# Patient Record
Sex: FEMALE | URBAN - METROPOLITAN AREA
[De-identification: names, ages, dates, MRNs, and addresses within clinical notes are randomized per-mention and may not be internally consistent; named-entity substitution may affect disease eponyms.]

---

## 2018-08-08 ENCOUNTER — APPOINTMENT (RX ONLY)
Dept: URBAN - METROPOLITAN AREA CLINIC 375 | Facility: CLINIC | Age: 53
Setting detail: DERMATOLOGY
End: 2018-08-08

## 2018-08-08 DIAGNOSIS — L82.1 OTHER SEBORRHEIC KERATOSIS: ICD-10-CM

## 2018-08-08 DIAGNOSIS — L57.8 OTHER SKIN CHANGES DUE TO CHRONIC EXPOSURE TO NONIONIZING RADIATION: ICD-10-CM

## 2018-08-08 DIAGNOSIS — D18.0 HEMANGIOMA: ICD-10-CM

## 2018-08-08 DIAGNOSIS — D22 MELANOCYTIC NEVI: ICD-10-CM

## 2018-08-08 DIAGNOSIS — L81.4 OTHER MELANIN HYPERPIGMENTATION: ICD-10-CM

## 2018-08-08 PROBLEM — I63.50 CEREBRAL INFARCTION DUE TO UNSPECIFIED OCCLUSION OR STENOSIS OF UNSPECIFIED CEREBRAL ARTERY: Status: ACTIVE | Noted: 2018-08-08

## 2018-08-08 PROBLEM — L20.84 INTRINSIC (ALLERGIC) ECZEMA: Status: ACTIVE | Noted: 2018-08-08

## 2018-08-08 PROBLEM — D22.5 MELANOCYTIC NEVI OF TRUNK: Status: ACTIVE | Noted: 2018-08-08

## 2018-08-08 PROBLEM — F41.9 ANXIETY DISORDER, UNSPECIFIED: Status: ACTIVE | Noted: 2018-08-08

## 2018-08-08 PROBLEM — J44.9 CHRONIC OBSTRUCTIVE PULMONARY DISEASE, UNSPECIFIED: Status: ACTIVE | Noted: 2018-08-08

## 2018-08-08 PROBLEM — D18.01 HEMANGIOMA OF SKIN AND SUBCUTANEOUS TISSUE: Status: ACTIVE | Noted: 2018-08-08

## 2018-08-08 PROBLEM — M12.9 ARTHROPATHY, UNSPECIFIED: Status: ACTIVE | Noted: 2018-08-08

## 2018-08-08 PROCEDURE — ? COUNSELING

## 2018-08-08 PROCEDURE — 99203 OFFICE O/P NEW LOW 30 MIN: CPT

## 2018-08-08 ASSESSMENT — LOCATION ZONE DERM
LOCATION ZONE: LEG
LOCATION ZONE: ARM
LOCATION ZONE: TRUNK

## 2018-08-08 ASSESSMENT — LOCATION DETAILED DESCRIPTION DERM
LOCATION DETAILED: RIGHT MEDIAL SUPERIOR CHEST
LOCATION DETAILED: RIGHT KNEE
LOCATION DETAILED: LEFT SUPERIOR UPPER BACK
LOCATION DETAILED: LEFT MID-UPPER BACK
LOCATION DETAILED: RIGHT LATERAL ELBOW
LOCATION DETAILED: LEFT DISTAL POSTERIOR UPPER ARM
LOCATION DETAILED: RIGHT MEDIAL UPPER BACK

## 2018-08-08 ASSESSMENT — LOCATION SIMPLE DESCRIPTION DERM
LOCATION SIMPLE: RIGHT KNEE
LOCATION SIMPLE: LEFT UPPER BACK
LOCATION SIMPLE: LEFT POSTERIOR UPPER ARM
LOCATION SIMPLE: RIGHT ELBOW
LOCATION SIMPLE: CHEST
LOCATION SIMPLE: RIGHT UPPER BACK

## 2024-02-13 ENCOUNTER — HOSPITAL ENCOUNTER (EMERGENCY)
Facility: HOSPITAL | Age: 59
Discharge: STILL ADMITTED OR ADMINISTRATIVE | End: 2024-02-13
Payer: COMMERCIAL

## 2024-02-13 ENCOUNTER — APPOINTMENT (OUTPATIENT)
Dept: RADIOLOGY | Facility: HOSPITAL | Age: 59
End: 2024-02-13
Payer: COMMERCIAL

## 2024-02-13 ENCOUNTER — HOSPITAL ENCOUNTER (EMERGENCY)
Facility: HOSPITAL | Age: 59
Discharge: LEFT BEFORE DISCHARGE | End: 2024-02-13
Attending: STUDENT IN AN ORGANIZED HEALTH CARE EDUCATION/TRAINING PROGRAM
Payer: COMMERCIAL

## 2024-02-13 VITALS
BODY MASS INDEX: 31.53 KG/M2 | HEIGHT: 61 IN | OXYGEN SATURATION: 97 % | RESPIRATION RATE: 16 BRPM | DIASTOLIC BLOOD PRESSURE: 90 MMHG | SYSTOLIC BLOOD PRESSURE: 145 MMHG | WEIGHT: 167 LBS | HEART RATE: 93 BPM | TEMPERATURE: 97.2 F

## 2024-02-13 PROCEDURE — 74022 RADEX COMPL AQT ABD SERIES: CPT | Performed by: RADIOLOGY

## 2024-02-13 PROCEDURE — 4500999001 HC ED NO CHARGE

## 2024-02-13 PROCEDURE — 74022 RADEX COMPL AQT ABD SERIES: CPT

## 2024-02-13 PROCEDURE — 99285 EMERGENCY DEPT VISIT HI MDM: CPT | Performed by: STUDENT IN AN ORGANIZED HEALTH CARE EDUCATION/TRAINING PROGRAM

## 2024-02-13 ASSESSMENT — PAIN - FUNCTIONAL ASSESSMENT: PAIN_FUNCTIONAL_ASSESSMENT: 0-10

## 2024-02-13 ASSESSMENT — COLUMBIA-SUICIDE SEVERITY RATING SCALE - C-SSRS
1. IN THE PAST MONTH, HAVE YOU WISHED YOU WERE DEAD OR WISHED YOU COULD GO TO SLEEP AND NOT WAKE UP?: NO
2. HAVE YOU ACTUALLY HAD ANY THOUGHTS OF KILLING YOURSELF?: NO
6. HAVE YOU EVER DONE ANYTHING, STARTED TO DO ANYTHING, OR PREPARED TO DO ANYTHING TO END YOUR LIFE?: NO

## 2024-02-13 ASSESSMENT — PAIN DESCRIPTION - ORIENTATION: ORIENTATION: RIGHT;LOWER

## 2024-02-13 ASSESSMENT — PAIN DESCRIPTION - LOCATION: LOCATION: ABDOMEN

## 2024-02-13 ASSESSMENT — PAIN DESCRIPTION - DESCRIPTORS: DESCRIPTORS: CRAMPING

## 2024-02-13 ASSESSMENT — PAIN SCALES - GENERAL: PAINLEVEL_OUTOF10: 8

## 2024-02-24 NOTE — ED PROVIDER NOTES
Chief Complaint: Abdominal pain  HPI: This is a 58-year-old female, presenting to the emergency department for evaluation of abdominal pain which began today after her colonoscopy.  Patient states that she had a colonoscopy today, and was discharged home.  She states that since that time she has had severe abdominal pain.  She also complains of some nausea.    History reviewed. No pertinent past medical history.   History reviewed. No pertinent surgical history.    Physical Exam  Constitutional:       Appearance: Normal appearance.   HENT:      Head: Normocephalic and atraumatic.      Mouth/Throat:      Mouth: Mucous membranes are moist.   Eyes:      Conjunctiva/sclera: Conjunctivae normal.   Cardiovascular:      Rate and Rhythm: Normal rate.   Pulmonary:      Effort: Pulmonary effort is normal.   Abdominal:      General: Abdomen is flat.      Palpations: Abdomen is soft.      Tenderness: There is abdominal tenderness (Diffuse).   Musculoskeletal:         General: Normal range of motion.      Cervical back: Normal range of motion.   Skin:     General: Skin is warm and dry.   Neurological:      General: No focal deficit present.      Mental Status: She is alert.   Psychiatric:         Mood and Affect: Mood normal.            ED Course/[facility]  ED Course as of 02/24/24 1623   Tue Feb 13, 2024   1258 XR abdomen 2 views w chest 1 view [JH]      ED Course User Index  [JH] Nicky Puente DO       This is a 59 y.o. female presenting to the ED for evaluation of abdominal pain which began today.  Patient states that she had a colonoscopy today, and since that time has had severe abdominal pain.  She also complains of nausea however denies any vomiting.  On physical exam, the patient does appear anxious, however is in no acute distress.  Abdomen does have diffuse tenderness however no rebound, guarding, rigidity.  X-ray of the abdomen was obtained for concern of possible free air given her recent procedure, however was  grossly unremarkable.  At this facility, the CT scanner is currently down, however I do feel that she needs a CT scan.  I did speak with the ED provider at [facility], as the patient may need a surgeon if there is abnormalities found on the CT scan, and he accepted as an ED to ED transfer.  I did discuss this with the patient as well as other options for her to get a CT scan today including being transported by ambulance to [place] or CT scan and then transport back to this facility, transfer to [facility] by ambulance or transfer to [facility] by private vehicle.  She was notified that if she does travel by private vehicle, she may have to wait in the waiting room as they do have several patients in the waiting room at this time.  Patient was adamant about not being transferred by ambulance, and I feel that she is too unstable for transfer by private vehicle.  She is also not wanting to wait in the waiting room at [facility].  I discussed her options again for further evaluation of the abdominal pain, however the patient became overwhelmed and left the department without treatment complete    Final Impression  1.  Abdominal pain  Disposition/Plan: Left the department without treatment complete  Condition at disposition: Stable.     Nicky Puente DO  Emergency Medicine Physician     Nicky Puente DO  02/24/24 0983

## 2024-05-02 ENCOUNTER — APPOINTMENT (OUTPATIENT)
Dept: CARDIOLOGY | Facility: CLINIC | Age: 59
End: 2024-05-02
Payer: COMMERCIAL

## 2024-05-23 ENCOUNTER — APPOINTMENT (OUTPATIENT)
Dept: CARDIOLOGY | Facility: CLINIC | Age: 59
End: 2024-05-23
Payer: COMMERCIAL

## 2024-08-09 ENCOUNTER — APPOINTMENT (OUTPATIENT)
Dept: PRIMARY CARE | Facility: CLINIC | Age: 59
End: 2024-08-09
Payer: COMMERCIAL

## 2024-08-09 VITALS
TEMPERATURE: 96.8 F | HEART RATE: 89 BPM | BODY MASS INDEX: 31.61 KG/M2 | DIASTOLIC BLOOD PRESSURE: 80 MMHG | OXYGEN SATURATION: 98 % | SYSTOLIC BLOOD PRESSURE: 120 MMHG | HEIGHT: 60 IN | WEIGHT: 161 LBS

## 2024-08-09 DIAGNOSIS — K57.33 DIVERTICULITIS OF COLON WITH HEMORRHAGE: Primary | ICD-10-CM

## 2024-08-09 DIAGNOSIS — I25.3 ATRIAL SEPTAL ANEURYSM: ICD-10-CM

## 2024-08-09 DIAGNOSIS — N61.0 ACUTE MASTITIS OF RIGHT BREAST: ICD-10-CM

## 2024-08-09 DIAGNOSIS — G95.9 MYELOPATHY (MULTI): ICD-10-CM

## 2024-08-09 DIAGNOSIS — Q14.0 VITREOUS ANOMALIES: ICD-10-CM

## 2024-08-09 DIAGNOSIS — Z12.31 SCREENING MAMMOGRAM, ENCOUNTER FOR: ICD-10-CM

## 2024-08-09 DIAGNOSIS — K29.50 OTHER CHRONIC GASTRITIS WITHOUT HEMORRHAGE: ICD-10-CM

## 2024-08-09 PROBLEM — D62 ACUTE BLOOD LOSS ANEMIA: Status: ACTIVE | Noted: 2024-01-19

## 2024-08-09 PROBLEM — E66.811 OBESITY, CLASS I, BMI 30-34.9: Status: ACTIVE | Noted: 2024-01-17

## 2024-08-09 PROBLEM — E66.9 OBESITY, CLASS I, BMI 30-34.9: Status: ACTIVE | Noted: 2024-01-17

## 2024-08-09 PROBLEM — F17.200 NICOTINE USE DISORDER: Status: ACTIVE | Noted: 2024-01-17

## 2024-08-09 PROBLEM — K63.5 POLYP OF ASCENDING COLON: Chronic | Status: ACTIVE | Noted: 2024-01-19

## 2024-08-09 PROBLEM — K62.5 RECTAL BLEEDING: Status: ACTIVE | Noted: 2024-01-17

## 2024-08-09 PROBLEM — K52.9 COLITIS: Status: ACTIVE | Noted: 2024-01-17

## 2024-08-09 PROCEDURE — 99204 OFFICE O/P NEW MOD 45 MIN: CPT

## 2024-08-09 PROCEDURE — 3008F BODY MASS INDEX DOCD: CPT

## 2024-08-09 RX ORDER — CEPHALEXIN 500 MG/1
500 CAPSULE ORAL 2 TIMES DAILY
Qty: 14 CAPSULE | Refills: 0 | Status: SHIPPED | OUTPATIENT
Start: 2024-08-09 | End: 2024-08-16

## 2024-08-09 NOTE — PATIENT INSTRUCTIONS
Referrals made for Neuro, Neuro ophthalmology  Cardiology referral  Continue to see GI specialist    Obtain info from Alfie Yeboah MD Neuro Ophthalmology    Cephalexin for Mastitis of right breast.    Thank you for coming in today, if any questions or concerns arise, please call my office.   Aki Parada, LAUREN-CNP

## 2024-08-09 NOTE — PROGRESS NOTES
Subjective   Patient ID: Yulia Parmar is a 59 y.o. female who presents for Establish Care (Yulia is here to establish care. Was previously treated for MS and last spinal tap came back negative. Has been off of medications for about a year in a half. Hs been having more trouble with her legs, heels and neck. Is needing to see a neurologist and opthalmology neurologist. Also has a lump on her breast that causes a lot of pain, unable to wear a bra du to this. ).  Yulia is a 59 year old who presents to establish care.   She has history of Polymyalgia without diagnosis of MS, last note was in 2010 for my record  She is current with GI on Amit[facility] with [facility] Dr. Carmona  Recent flare of Diverticulitis in the springtime, liquid diet helped this.    She did have ER visit for Mastitis in years past and was treated with Cephalexin, she complains of breast pain today.  --Chaperone present for exam see PE  --redness with small amount of swelling subcentimeter area  --no fever, however treat for mastitis with late generation Cephalosporin    Atrial Septal Aneurysm  --has not seen cardiology in years  --consult placed, no murmur present on exam she is asymptomatic    Moved from [place] two years ago          Vitals:    08/09/24 1355   BP: 120/80   Pulse: 89   Temp: 36 °C (96.8 °F)   SpO2: 98%       Review of Systems    Objective   Physical Exam  Vitals and nursing note reviewed. Exam conducted with a chaperone present (Hernandez Das MA).   Chest:             Assessment/Plan   Problem List Items Addressed This Visit       Diverticulitis of colon with hemorrhage - Primary    Myelopathy (Multi)    Relevant Orders    Referral to Neurology    Chronic gastritis without bleeding    Atrial septal aneurysm    Relevant Orders    Referral to Cardiology     Other Visit Diagnoses       Vitreous anomalies        Relevant Orders    Referral to Ophthalmology    Acute mastitis of right breast        Relevant Medications    cephalexin  (Keflex) 500 mg capsule    Other Relevant Orders    BI US breast complete right    Screening mammogram, encounter for        Relevant Orders    BI mammo bilateral screening tomosynthesis                 Thank you for coming in today, please call my office if you have any concerns or questions.     Aki AGUILAR, CNP

## 2024-08-21 ENCOUNTER — HOSPITAL ENCOUNTER (OUTPATIENT)
Dept: RADIOLOGY | Facility: HOSPITAL | Age: 59
Discharge: HOME | End: 2024-08-21
Payer: COMMERCIAL

## 2024-08-21 VITALS — WEIGHT: 165 LBS | BODY MASS INDEX: 31.15 KG/M2 | HEIGHT: 61 IN

## 2024-08-21 DIAGNOSIS — Z12.31 SCREENING MAMMOGRAM, ENCOUNTER FOR: ICD-10-CM

## 2024-08-21 DIAGNOSIS — N61.0 ACUTE MASTITIS OF RIGHT BREAST: ICD-10-CM

## 2024-08-21 PROCEDURE — 77062 BREAST TOMOSYNTHESIS BI: CPT | Performed by: RADIOLOGY

## 2024-08-21 PROCEDURE — 77066 DX MAMMO INCL CAD BI: CPT | Performed by: RADIOLOGY

## 2024-08-21 PROCEDURE — 76982 USE 1ST TARGET LESION: CPT | Mod: 50

## 2024-08-21 PROCEDURE — 76642 ULTRASOUND BREAST LIMITED: CPT | Performed by: RADIOLOGY

## 2024-08-21 PROCEDURE — 76642 ULTRASOUND BREAST LIMITED: CPT | Mod: 50

## 2024-08-21 PROCEDURE — 77062 BREAST TOMOSYNTHESIS BI: CPT

## 2024-08-23 ENCOUNTER — TELEPHONE (OUTPATIENT)
Dept: PRIMARY CARE | Facility: CLINIC | Age: 59
End: 2024-08-23
Payer: COMMERCIAL

## 2024-08-23 NOTE — RESULT ENCOUNTER NOTE
Care Management/Social Work Discharge Arrangements: No need for help at home was identified during your hospital stay. If you feel you need more help please contact your primary care provider for additional resources.      Results are normal, please notify the patient. Normal return to yearly mammogram screening unless pain persists.

## 2024-08-23 NOTE — TELEPHONE ENCOUNTER
----- Message from Aki Parada sent at 8/23/2024  8:17 AM EDT -----  Results are normal, please notify the patient. Normal return to yearly mammogram screening unless pain persists.

## 2024-09-14 ENCOUNTER — HOSPITAL ENCOUNTER (EMERGENCY)
Facility: HOSPITAL | Age: 59
Discharge: HOME | End: 2024-09-14
Payer: COMMERCIAL

## 2024-09-14 VITALS
WEIGHT: 169 LBS | OXYGEN SATURATION: 99 % | TEMPERATURE: 97.2 F | BODY MASS INDEX: 31.91 KG/M2 | DIASTOLIC BLOOD PRESSURE: 86 MMHG | RESPIRATION RATE: 17 BRPM | HEIGHT: 61 IN | HEART RATE: 96 BPM | SYSTOLIC BLOOD PRESSURE: 129 MMHG

## 2024-09-14 DIAGNOSIS — L30.1 DYSHIDROTIC DERMATITIS: Primary | ICD-10-CM

## 2024-09-14 LAB
APPEARANCE UR: CLEAR
BACTERIA #/AREA URNS AUTO: ABNORMAL /HPF
BILIRUB UR STRIP.AUTO-MCNC: NEGATIVE MG/DL
COLOR UR: YELLOW
GLUCOSE UR STRIP.AUTO-MCNC: NEGATIVE MG/DL
HOLD SPECIMEN: NORMAL
KETONES UR STRIP.AUTO-MCNC: NEGATIVE MG/DL
LEUKOCYTE ESTERASE UR QL STRIP.AUTO: NEGATIVE
NITRITE UR QL STRIP.AUTO: NEGATIVE
PH UR STRIP.AUTO: 6 [PH]
PROT UR STRIP.AUTO-MCNC: NEGATIVE MG/DL
RBC # UR STRIP.AUTO: ABNORMAL /UL
RBC #/AREA URNS AUTO: ABNORMAL /HPF
SP GR UR STRIP.AUTO: 1
SQUAMOUS #/AREA URNS AUTO: ABNORMAL /HPF
UROBILINOGEN UR STRIP.AUTO-MCNC: <2 MG/DL
WBC #/AREA URNS AUTO: ABNORMAL /HPF
WBC CLUMPS #/AREA URNS AUTO: ABNORMAL /HPF

## 2024-09-14 PROCEDURE — 2500000004 HC RX 250 GENERAL PHARMACY W/ HCPCS (ALT 636 FOR OP/ED)

## 2024-09-14 PROCEDURE — 99283 EMERGENCY DEPT VISIT LOW MDM: CPT

## 2024-09-14 PROCEDURE — 81001 URINALYSIS AUTO W/SCOPE: CPT | Performed by: EMERGENCY MEDICINE

## 2024-09-14 RX ORDER — DEXAMETHASONE 4 MG/1
TABLET ORAL
Status: COMPLETED
Start: 2024-09-14 | End: 2024-09-14

## 2024-09-14 RX ORDER — METHYLPREDNISOLONE 4 MG/1
TABLET ORAL
Qty: 21 TABLET | Refills: 0 | Status: SHIPPED | OUTPATIENT
Start: 2024-09-14 | End: 2024-09-18 | Stop reason: SDUPTHER

## 2024-09-14 RX ORDER — DEXAMETHASONE 4 MG/1
4 TABLET ORAL ONCE
Status: COMPLETED | OUTPATIENT
Start: 2024-09-14 | End: 2024-09-14

## 2024-09-14 RX ADMIN — DEXAMETHASONE 4 MG: 4 TABLET ORAL at 08:15

## 2024-09-14 ASSESSMENT — PAIN SCALES - GENERAL: PAINLEVEL_OUTOF10: 10 - WORST POSSIBLE PAIN

## 2024-09-14 ASSESSMENT — PAIN DESCRIPTION - DESCRIPTORS: DESCRIPTORS: BURNING;NUMBNESS

## 2024-09-14 ASSESSMENT — PAIN DESCRIPTION - ORIENTATION: ORIENTATION: RIGHT;LEFT

## 2024-09-14 ASSESSMENT — PAIN DESCRIPTION - FREQUENCY: FREQUENCY: CONSTANT/CONTINUOUS

## 2024-09-14 ASSESSMENT — COLUMBIA-SUICIDE SEVERITY RATING SCALE - C-SSRS
6. HAVE YOU EVER DONE ANYTHING, STARTED TO DO ANYTHING, OR PREPARED TO DO ANYTHING TO END YOUR LIFE?: NO
1. IN THE PAST MONTH, HAVE YOU WISHED YOU WERE DEAD OR WISHED YOU COULD GO TO SLEEP AND NOT WAKE UP?: NO
2. HAVE YOU ACTUALLY HAD ANY THOUGHTS OF KILLING YOURSELF?: NO

## 2024-09-14 ASSESSMENT — PAIN - FUNCTIONAL ASSESSMENT: PAIN_FUNCTIONAL_ASSESSMENT: 0-10

## 2024-09-14 ASSESSMENT — PAIN DESCRIPTION - PAIN TYPE: TYPE: ACUTE PAIN

## 2024-09-14 ASSESSMENT — PAIN DESCRIPTION - LOCATION: LOCATION: FINGER (COMMENT WHICH ONE)

## 2024-09-14 NOTE — DISCHARGE INSTRUCTIONS
You have been diagnosed with dyshidrotic eczema.  Follow-up with dermatology if not improved in 1 week.  Put Vaseline in your hands to keep COVID  Put vaseline in your hands at night time

## 2024-09-14 NOTE — ED TRIAGE NOTES
Pt came into ED with complaints of poison ivy on her hands and fingers. Once in triage pt also complains of lower abdominal pain, but cites no urinary complaints.

## 2024-09-14 NOTE — ED PROVIDER NOTES
HPI   Chief Complaint   Patient presents with    Rash    Abdominal Pain       59-year-old female presents to the emergency department complaining of rash to hands and dysuria.  Patient states she has had the symptoms for the past 3 weeks.  States that her hands are really itchy.  Denies any use of soaps or detergents.  No chemicals.  Denies any fevers or chills, nausea or vomiting or any other complaints.  Has not taken anything to help with the symptoms.              Patient History   History reviewed. No pertinent past medical history.  Past Surgical History:   Procedure Laterality Date    EXCISION / BIOPSY BREAST / NIPPLE / DUCT Right 08/21/2009    BENIGN    EYE SURGERY       Family History   Problem Relation Name Age of Onset    Brain cancer Mother      Breast cancer Mother      Lung cancer Mother      Prostate cancer Father      Stomach cancer Maternal Grandmother       Social History     Tobacco Use    Smoking status: Every Day     Current packs/day: 1.00     Types: Cigarettes    Smokeless tobacco: Never   Vaping Use    Vaping status: Never Used   Substance Use Topics    Alcohol use: Not Currently    Drug use: Never       Physical Exam   ED Triage Vitals [09/14/24 0742]   Temperature Heart Rate Respirations BP   36.2 °C (97.2 °F) 96 17 129/86      SpO2 Temp src Heart Rate Source Patient Position   99 % -- -- --      BP Location FiO2 (%)     Right arm --       Physical Exam  HENT:      Head: Normocephalic and atraumatic.   Cardiovascular:      Rate and Rhythm: Normal rate and regular rhythm.   Abdominal:      Palpations: Abdomen is soft.      Tenderness: There is no abdominal tenderness.   Skin:     General: Skin is warm and dry.      Comments: Dyshidrosis on both hands   Neurological:      General: No focal deficit present.      Mental Status: She is alert and oriented to person, place, and time.           ED Course & MDM   Diagnoses as of 09/14/24 0822   Dyshidrotic dermatitis                 No data recorded                                  Medical Decision Making  59-year-old female who presents to the emergency department with rash to hands.  Present over the past 3 weeks.  Differential includes contact dermatitis, atopic dermatitis, vasculitis, infectious process, dyshidrosis.  No history of use of harsh detergents or chemicals.  Clinically dyshidrotic rash.  The patient will be treated with oral steroids due to the extension.  Also complains of lower abdominal cramping.  States that sometimes she gets it when she is constipated but also concerned that she may have a urinary tract infection.  Urine reveals no evidence of acute UTI.  She has been moving her bowels.  States she will take some stool softeners.  No need for further intervention.  No abdominal pain at present time.  Patient to follow-up with primary care.  I will also give her follow-up with dermatology due to the extension of the rash in the hands        Procedure  Procedures     Noel Lopes MD  09/14/24 3512

## 2024-09-18 ENCOUNTER — APPOINTMENT (OUTPATIENT)
Dept: PRIMARY CARE | Facility: CLINIC | Age: 59
End: 2024-09-18
Payer: COMMERCIAL

## 2024-09-18 VITALS
OXYGEN SATURATION: 97 % | TEMPERATURE: 97.3 F | DIASTOLIC BLOOD PRESSURE: 80 MMHG | SYSTOLIC BLOOD PRESSURE: 118 MMHG | BODY MASS INDEX: 28.91 KG/M2 | HEART RATE: 88 BPM | WEIGHT: 153 LBS

## 2024-09-18 DIAGNOSIS — L30.1 DYSHIDROTIC DERMATITIS: ICD-10-CM

## 2024-09-18 PROCEDURE — 99214 OFFICE O/P EST MOD 30 MIN: CPT

## 2024-09-18 RX ORDER — METHYLPREDNISOLONE 4 MG/1
TABLET ORAL
Qty: 21 TABLET | Refills: 0 | Status: SHIPPED | OUTPATIENT
Start: 2024-09-18 | End: 2024-09-25

## 2024-09-18 NOTE — PATIENT INSTRUCTIONS
Refill medrol pack    Thank you for coming in today, if any questions or concerns arise, please call my office.   LAUREN Robles-CNP

## 2024-09-18 NOTE — PROGRESS NOTES
Subjective   Patient ID: Yulia Parmar is a 59 y.o. female who presents for Rash (X3 weeks-Bilateral hand, small blisters after melquiades tomatoes and potatoes and working in garden. Had swelling, did have some weeping. Went to [facility] and was referred to dermatology- wanted to come here first before seeing derm).  Patient presents for er follow up  Had straw colored weeping rash to bilateral hands  On medrol pack now, doing well, requesting refill.        Vitals:    09/18/24 1105   BP: 118/80   Pulse: 88   Temp: 36.3 °C (97.3 °F)   SpO2: 97%       Review of Systems    Objective   Physical Exam  Vitals and nursing note reviewed.   Constitutional:       Appearance: Normal appearance. She is not ill-appearing.   Musculoskeletal:        Hands:       Comments: Small blistering, erythematous rash, dry, no weeping.         Assessment/Plan   Problem List Items Addressed This Visit    None  Visit Diagnoses       Dyshidrotic dermatitis        Relevant Medications    methylPREDNISolone (Medrol Dospak) 4 mg tablets                 Thank you for coming in today, please call my office if you have any concerns or questions.     Aki AGUILAR, CNP

## 2024-09-30 DIAGNOSIS — L30.1 DYSHIDROTIC DERMATITIS: Primary | ICD-10-CM

## 2024-09-30 RX ORDER — BETAMETHASONE DIPROPIONATE 0.5 MG/G
CREAM TOPICAL 2 TIMES DAILY PRN
Qty: 15 G | Refills: 0 | Status: SHIPPED | OUTPATIENT
Start: 2024-09-30 | End: 2025-01-28

## 2024-10-02 ENCOUNTER — APPOINTMENT (OUTPATIENT)
Dept: CARDIOLOGY | Facility: CLINIC | Age: 59
End: 2024-10-02
Payer: COMMERCIAL

## 2024-10-02 VITALS
DIASTOLIC BLOOD PRESSURE: 81 MMHG | SYSTOLIC BLOOD PRESSURE: 120 MMHG | OXYGEN SATURATION: 98 % | HEART RATE: 95 BPM | BODY MASS INDEX: 30.45 KG/M2 | WEIGHT: 161.16 LBS

## 2024-10-02 DIAGNOSIS — I25.3 ATRIAL SEPTAL ANEURYSM: ICD-10-CM

## 2024-10-02 DIAGNOSIS — R94.31 ABNORMAL EKG: ICD-10-CM

## 2024-10-02 PROCEDURE — 99204 OFFICE O/P NEW MOD 45 MIN: CPT | Performed by: NURSE PRACTITIONER

## 2024-10-02 NOTE — PROGRESS NOTES
Primary Care Physician: LAUREN Robles-CNP  Primary Cardiologist:      Date of Visit: 10/02/2024 11:00 AM EDT  Location of visit:  [address]   Type of Visit: New Patient        Chief Complaint   Patient presents with    New Patient Visit     Wants to discuss ultrasound for her atrial septal aneurysm, that hasn't been checked in 3-4 years.        HPI / Summary:   Yulia Parmar is a 59 y.o. female who presents to establish cardiac care history of atrial septal aneurysm when she was living in [place], [place]. She is here with concerns about it being so long since last check and wanting evaluation. Denies exertional SOB, CP. BERMAN is chronic from smoking 1pk/day.  Family history: Dad- MI (multiple), Aunts and uncles as well  Denies ETOH, Denies illicit drugs  Not currently working     12 system review is negative except as noted above    Medical History:   History reviewed. No pertinent past medical history.    Surgical History:   Past Surgical History:   Procedure Laterality Date    EXCISION / BIOPSY BREAST / NIPPLE / DUCT Right 08/21/2009    BENIGN    EYE SURGERY         Family History:   Family History   Problem Relation Name Age of Onset    Brain cancer Mother      Breast cancer Mother      Lung cancer Mother      Prostate cancer Father      Stomach cancer Maternal Grandmother       Social History:   Tobacco Use: High Risk (10/2/2024)    Patient History     Smoking Tobacco Use: Every Day     Smokeless Tobacco Use: Never     Passive Exposure: Not on file       MEDICATIONS:   Current Outpatient Medications   Medication Instructions    betamethasone dipropionate 0.05 % cream Topical, 2 times daily PRN       IMAGING REVIEWED:   Echocardiogram: No results found for this or any previous visit from the past 1825 days.    Stress Testing: No results found for this or any previous visit from the past 1825 days.    Cardiac Catheterization: No results found for this or any previous visit from the past 1825  "days.    Cardiac Scoring: No results found for this or any previous visit from the past 1825 days.    AAA : No results found for this or any previous visit from the past 1825 days.    OTHER: No results found for this or any previous visit from the past 1825 days.        LABS:  CBC: No results found for: \"WBC\", \"RBC\", \"HGB\", \"HCT\", \"MCV\", \"MCH\", \"MCHC\", \"RDW\", \"PLT\", \"MPV\"  CBC with Differential:  No results found for: \"WBC\", \"RBC\", \"HGB\", \"HCT\", \"PLT\", \"MCV\", \"MCH\", \"MCHC\", \"RDW\", \"NRBC\", \"SEGSPCT\", \"BANDSPCT\", \"BLASTSPCT\", \"METASPCT\", \"LYMPHOPCT\", \"PROMYELOPCT\", \"MONOPCT\", \"MYELOPCT\", \"EOSPCT\", \"BASOPCT\", \"MONOSABS\", \"LYMPHSABS\", \"EOSABS\", \"BASOSABS\", \"DIFFTYPE\"  CMP:  No results found for: \"NA\", \"K\", \"CL\", \"CO2\", \"BUN\", \"CREATININE\", \"AGRATIO\", \"GLUCOSE\", \"GLU\", \"PROT\", \"CALCIUM\", \"BILITOT\", \"ALKPHOS\", \"AST\", \"ALT\"  BMP:  No results found for: \"NA\", \"K\", \"CL\", \"CO2\", \"BUN\", \"CREATININE\", \"CALCIUM\", \"GLUCOSE\", \"GLU\"  Magnesium:No results found for: \"MG\"  Troponin:  No results found for: \"TROPHS\"  BNP: No results found for: \"BNP\"    Lipid Panel:  No results found for: \"HDL\", \"CHHDL\", \"VLDL\", \"TRIG\", \"NHDL\"     Lab work and imaging results independently reviewed by me         Visit Vitals  /81   Pulse 95   Wt 73.1 kg (161 lb 2.5 oz)   SpO2 98%   BMI 30.45 kg/m²   OB Status Postmenopausal   Smoking Status Every Day   BSA 1.77 m²        ECG dated 10/2/2024 independently reviewed   NSR, left axis deviation       Vitals reviewed.   Constitutional:       General: Awake.      Appearance: Normal appearance. Well-developed and not in distress.   Eyes:      General: Lids are normal.      Pupils: Pupils are equal, round, and reactive to light.   HENT:      Right Ear: External ear normal.      Left Ear: External ear normal.      Nose: Nose normal.    Mouth/Throat:      Lips: Pink.      Mouth: Mucous membranes are moist.   Neck:      Vascular: JVD normal.   Pulmonary:      Breath sounds: Normal air entry.   Cardiovascular: "      PMI at left midclavicular line. Normal rate. Regular rhythm. Normal S1. Normal S2.       Murmurs: There is no murmur.   Edema:     Peripheral edema absent.   Abdominal:      General: Bowel sounds are normal.      Palpations: Abdomen is soft.      Tenderness: There is no abdominal tenderness.   Musculoskeletal: Normal range of motion.      Cervical back: Full passive range of motion without pain, normal range of motion and neck supple. Skin:     General: Skin is warm and dry.   Neurological:      General: No focal deficit present.      Mental Status: Alert, oriented to person, place, and time and oriented to person, place and time. Mental status is at baseline.   Psychiatric:         Attention and Perception: Attention normal.         Mood and Affect: Mood normal.         Speech: Speech normal.         Problem List Items Addressed This Visit             ICD-10-CM    Atrial septal aneurysm I25.3    Relevant Orders    Transthoracic echo (TTE) complete     Other Visit Diagnoses         Codes    Abnormal EKG     R94.31    Relevant Orders    ECG 12 lead (Ancillary Performed)    Transthoracic echo (TTE) complete          History of Atrial septal aneurysm, will obtain records from [facility]  Echo to assess LVEF and structural heart for surveillance  Counseled on Lifestyle and dietary changes  Follow up after results      10/02/24 at 11:24 AM - LAUREN Espinosa-CNP        Followup Appts:  Future Appointments   Date Time Provider Department Center   12/31/2024 11:00 AM Norma Starks MD [id number] [facility]

## 2024-10-02 NOTE — PATIENT INSTRUCTIONS
Echocardiogram to look at heart structure and function      Will obtain Records from [place]     Follow up after results

## 2024-10-03 ENCOUNTER — HOSPITAL ENCOUNTER (OUTPATIENT)
Dept: CARDIOLOGY | Facility: HOSPITAL | Age: 59
Discharge: HOME | End: 2024-10-03
Payer: COMMERCIAL

## 2024-10-03 DIAGNOSIS — R94.31 ABNORMAL EKG: ICD-10-CM

## 2024-10-03 LAB
ATRIAL RATE: 87 BPM
P AXIS: 47 DEGREES
P OFFSET: 197 MS
P ONSET: 153 MS
PR INTERVAL: 126 MS
Q ONSET: 216 MS
QRS COUNT: 14 BEATS
QRS DURATION: 82 MS
QT INTERVAL: 344 MS
QTC CALCULATION(BAZETT): 413 MS
QTC FREDERICIA: 389 MS
R AXIS: -37 DEGREES
T AXIS: 32 DEGREES
T OFFSET: 388 MS
VENTRICULAR RATE: 87 BPM

## 2024-10-03 PROCEDURE — 93005 ELECTROCARDIOGRAM TRACING: CPT

## 2024-10-11 ENCOUNTER — OFFICE VISIT (OUTPATIENT)
Dept: PRIMARY CARE | Facility: CLINIC | Age: 59
End: 2024-10-11
Payer: COMMERCIAL

## 2024-10-11 VITALS
HEART RATE: 89 BPM | WEIGHT: 160.8 LBS | TEMPERATURE: 95.9 F | BODY MASS INDEX: 30.38 KG/M2 | OXYGEN SATURATION: 98 % | SYSTOLIC BLOOD PRESSURE: 120 MMHG | DIASTOLIC BLOOD PRESSURE: 70 MMHG

## 2024-10-11 DIAGNOSIS — K57.92 ACUTE DIVERTICULITIS: Primary | ICD-10-CM

## 2024-10-11 DIAGNOSIS — L30.1 DYSHIDROTIC DERMATITIS: ICD-10-CM

## 2024-10-11 PROCEDURE — 99213 OFFICE O/P EST LOW 20 MIN: CPT

## 2024-10-11 RX ORDER — TRIAMCINOLONE ACETONIDE 1 MG/G
CREAM TOPICAL 2 TIMES DAILY
Qty: 453.6 G | Refills: 0 | Status: SHIPPED | OUTPATIENT
Start: 2024-10-11 | End: 2024-10-11

## 2024-10-11 RX ORDER — CIPROFLOXACIN 500 MG/1
500 TABLET ORAL 2 TIMES DAILY
COMMUNITY
Start: 2024-10-08 | End: 2024-10-18

## 2024-10-11 RX ORDER — METRONIDAZOLE 500 MG/1
500 TABLET ORAL 3 TIMES DAILY
COMMUNITY
Start: 2024-10-08 | End: 2024-10-18

## 2024-10-11 RX ORDER — TRIAMCINOLONE ACETONIDE 1 MG/G
OINTMENT TOPICAL 2 TIMES DAILY PRN
Qty: 453.6 G | Refills: 0 | Status: SHIPPED | OUTPATIENT
Start: 2024-10-11 | End: 2025-10-11

## 2024-10-11 NOTE — PROGRESS NOTES
Subjective   Patient ID: Yulia Parmar is a 59 y.o. female who presents for ER Follow-up (Went [facility] on 10/8/24 for abdominal pain and was diagnosed with diverticulitis. Given meds and is feeling much better).  Subjective  Yulia Parmar is an 59 y.o. female who presents for Follow up of diverticulitis.   She is following up today from ER  She had rapid relief of symptoms with Cipro/Flagyl combo    Objective  [unfilled]    Assessment/Plan  Diverticulitis, resolving.    1. Recommend rest and increased fluids.  Antibiotics as ordered below. Side effects of the antibiotics were discussed, including the possibility of rash, photosensitivity, gastrointestinal upset, and incompatibility with alcohol.    2. Patient is instructed to avoid nuts and seeds.    3. She will call if her symptoms worsen, new problems develop, intractable nausea/vomiting occurs, fever of 101.0 (orally) or greater occurs, or if all symptoms are not dramatically improved in 48 hours and completely resolved in 5 days. The patient understands that hospitalization for intravenous fluids and/or antibiotics may be necessary if her symptoms worsen or persist.     4. Follow up PRN          Vitals:    10/11/24 1026   BP: 120/70   Pulse: 89   Temp: 35.5 °C (95.9 °F)   SpO2: 98%       Review of Systems    Objective   Physical Exam    Assessment/Plan   Problem List Items Addressed This Visit    None  Visit Diagnoses       Acute diverticulitis    -  Primary    Dyshidrotic dermatitis        Relevant Medications    triamcinolone (Kenalog) 0.1 % ointment                 Thank you for coming in today, please call my office if you have any concerns or questions.     Aki AGUILAR, CNP

## 2024-10-11 NOTE — PATIENT INSTRUCTIONS
Finish full course of antibiotic  If abdominal pain or fever starts, go to the ER immediately    Thank you for coming in today, if any questions or concerns arise, please call my office.   Aki Parada, LAUREN-CNP

## 2024-10-16 ENCOUNTER — HOSPITAL ENCOUNTER (OUTPATIENT)
Dept: CARDIOLOGY | Facility: HOSPITAL | Age: 59
Discharge: HOME | End: 2024-10-16
Payer: COMMERCIAL

## 2024-10-16 VITALS — SYSTOLIC BLOOD PRESSURE: 124 MMHG | DIASTOLIC BLOOD PRESSURE: 85 MMHG

## 2024-10-16 DIAGNOSIS — I25.3 ATRIAL SEPTAL ANEURYSM: ICD-10-CM

## 2024-10-16 DIAGNOSIS — R94.31 ABNORMAL EKG: ICD-10-CM

## 2024-10-16 LAB
AORTIC VALVE PEAK VELOCITY: 1.33 M/S
AV PEAK GRADIENT: 7.1 MMHG
AVA (PEAK VEL): 2.28 CM2
EJECTION FRACTION APICAL 4 CHAMBER: 64.2
EJECTION FRACTION: 63 %
LEFT ATRIUM VOLUME AREA LENGTH INDEX BSA: 17.8 ML/M2
LEFT VENTRICLE INTERNAL DIMENSION DIASTOLE: 4.78 CM (ref 3.5–6)
LEFT VENTRICULAR OUTFLOW TRACT DIAMETER: 2.16 CM
MITRAL VALVE E/A RATIO: 0.76
RIGHT VENTRICLE FREE WALL PEAK S': 11 CM/S
TRICUSPID ANNULAR PLANE SYSTOLIC EXCURSION: 1.9 CM

## 2024-10-16 PROCEDURE — 93306 TTE W/DOPPLER COMPLETE: CPT

## 2024-10-16 PROCEDURE — 93306 TTE W/DOPPLER COMPLETE: CPT | Performed by: INTERNAL MEDICINE

## 2024-10-17 ENCOUNTER — TELEMEDICINE (OUTPATIENT)
Dept: CARDIOLOGY | Facility: CLINIC | Age: 59
End: 2024-10-17
Payer: COMMERCIAL

## 2024-10-17 DIAGNOSIS — F17.200 NICOTINE USE DISORDER: ICD-10-CM

## 2024-10-17 DIAGNOSIS — I25.3 ATRIAL SEPTAL ANEURYSM: Primary | ICD-10-CM

## 2024-10-17 PROCEDURE — 99442 PR PHYS/QHP TELEPHONE EVALUATION 11-20 MIN: CPT | Performed by: NURSE PRACTITIONER

## 2024-10-17 NOTE — PROGRESS NOTES
Primary Care Physician: LAUREN Robles-CNP  Primary Cardiologist:      Date of Visit: 10/17/2024  1:20 PM EDT  Location of visit:  [address]   Type of Visit: New Patient    Chief Complaint   Patient presents with    Follow-up      Telephone call for test results      HPI / Summary:   Yulia Parmar is a 59 y.o. female who presents to establish cardiac care history of atrial septal aneurysm when she was living in [place], [place]. Follow up today to review results of Echocardiogram.   Denies exertional SOB, CP. BERMAN is chronic from smoking 1pk/day. She is overall doing well just wants to keep up on cardiac care due to history.     Family history: Dad- MI (multiple), Aunts and uncles as well  Denies ETOH, Denies illicit drugs  Not currently working     12 system review is negative except as noted above    Medical History:   History reviewed. No pertinent past medical history.    Surgical History:   Past Surgical History:   Procedure Laterality Date    EXCISION / BIOPSY BREAST / NIPPLE / DUCT Right 08/21/2009    BENIGN    EYE SURGERY         Family History:   Family History   Problem Relation Name Age of Onset    Brain cancer Mother      Breast cancer Mother      Lung cancer Mother      Prostate cancer Father      Stomach cancer Maternal Grandmother       Social History:   Tobacco Use: High Risk (10/17/2024)    Patient History     Smoking Tobacco Use: Every Day     Smokeless Tobacco Use: Never     Passive Exposure: Not on file       MEDICATIONS:   Current Outpatient Medications   Medication Instructions    betamethasone dipropionate 0.05 % cream Topical, 2 times daily PRN    triamcinolone (Kenalog) 0.1 % ointment Topical, 2 times daily PRN       IMAGING REVIEWED:   Echocardiogram 10/16/2024  CONCLUSIONS:   1. Left ventricular ejection fraction is normal, by visual estimate at 60-65%.   2. Spectral Doppler shows an impaired relaxation pattern of left ventricular diastolic filling.   3. There is normal  right ventricular global systolic function.  Stress Testing: No results found for this or any previous visit from the past 1825 days.      Lab work and imaging results independently reviewed by me       Visit Vitals  OB Status Postmenopausal   Smoking Status Every Day        ECG dated 10/2/2024 independently reviewed   NSR, left axis deviation       Vitals reviewed.   Constitutional:       General: Awake.      Appearance: Normal appearance. Well-developed and not in distress.   Eyes:      General: Lids are normal.      Pupils: Pupils are equal, round, and reactive to light.   HENT:      Right Ear: External ear normal.      Left Ear: External ear normal.      Nose: Nose normal.    Mouth/Throat:      Lips: Pink.      Mouth: Mucous membranes are moist.   Neck:      Vascular: JVD normal.   Pulmonary:      Breath sounds: Normal air entry.   Cardiovascular:      PMI at left midclavicular line. Normal rate. Regular rhythm. Normal S1. Normal S2.       Murmurs: There is no murmur.   Edema:     Peripheral edema absent.   Abdominal:      General: Bowel sounds are normal.      Palpations: Abdomen is soft.      Tenderness: There is no abdominal tenderness.   Musculoskeletal: Normal range of motion.      Cervical back: Full passive range of motion without pain, normal range of motion and neck supple. Skin:     General: Skin is warm and dry.   Neurological:      General: No focal deficit present.      Mental Status: Alert, oriented to person, place, and time and oriented to person, place and time. Mental status is at baseline.   Psychiatric:         Attention and Perception: Attention normal.         Mood and Affect: Mood normal.         Speech: Speech normal.       Problem List Items Addressed This Visit             ICD-10-CM    Atrial septal aneurysm - Primary I25.3       History of Atrial septal aneurysm, will obtain records [facility]  Echo with preserved LVEF- noted evidence of atrial septal aneurysm unsure PFO  refused bubble study  Counseled on Lifestyle and dietary changes  Follow up as needed  Call with questions or concerns      10/22/24 at 9:57 AM - LAUREN Espinosa-CNP        Followup Appts:  Future Appointments   Date Time Provider Department Center   12/31/2024 11:00 AM Norma Starks MD [id number] [facility]

## 2024-10-22 DIAGNOSIS — L30.1 DYSHIDROTIC DERMATITIS: ICD-10-CM

## 2024-10-22 DIAGNOSIS — L30.1 DYSHIDROTIC DERMATITIS: Primary | ICD-10-CM

## 2024-10-22 RX ORDER — BETAMETHASONE DIPROPIONATE 0.5 MG/G
CREAM TOPICAL 2 TIMES DAILY PRN
Qty: 45 G | Refills: 3 | Status: SHIPPED | OUTPATIENT
Start: 2024-10-22 | End: 2025-02-19

## 2024-10-22 RX ORDER — CLOBETASOL PROPIONATE 0.5 MG/G
CREAM TOPICAL 2 TIMES DAILY
Qty: 60 G | Refills: 3 | Status: SHIPPED | OUTPATIENT
Start: 2024-10-22 | End: 2024-11-05

## 2024-10-23 RX ORDER — CLOBETASOL PROPIONATE 0.5 MG/G
OINTMENT TOPICAL 2 TIMES DAILY
Qty: 60 G | Refills: 3 | Status: SHIPPED | OUTPATIENT
Start: 2024-10-23 | End: 2024-11-06

## 2024-10-23 RX ORDER — CLOBETASOL PROPIONATE 0.5 MG/G
CREAM TOPICAL
Qty: 60 G | Refills: 3 | OUTPATIENT
Start: 2024-10-23

## 2024-10-24 ENCOUNTER — APPOINTMENT (OUTPATIENT)
Dept: CARDIOLOGY | Facility: CLINIC | Age: 59
End: 2024-10-24
Payer: COMMERCIAL

## 2024-11-06 ENCOUNTER — APPOINTMENT (OUTPATIENT)
Dept: CARDIOLOGY | Facility: CLINIC | Age: 59
End: 2024-11-06
Payer: COMMERCIAL

## 2024-12-31 ENCOUNTER — APPOINTMENT (OUTPATIENT)
Dept: NEUROLOGY | Facility: HOSPITAL | Age: 59
End: 2024-12-31
Payer: COMMERCIAL

## 2025-02-27 ENCOUNTER — APPOINTMENT (OUTPATIENT)
Dept: PRIMARY CARE | Facility: CLINIC | Age: 60
End: 2025-02-27
Payer: COMMERCIAL

## 2025-02-27 VITALS
DIASTOLIC BLOOD PRESSURE: 88 MMHG | HEART RATE: 63 BPM | OXYGEN SATURATION: 97 % | SYSTOLIC BLOOD PRESSURE: 126 MMHG | TEMPERATURE: 96.3 F | BODY MASS INDEX: 31.95 KG/M2 | WEIGHT: 169.1 LBS

## 2025-02-27 DIAGNOSIS — M94.0 COSTOCHONDRITIS: Primary | ICD-10-CM

## 2025-02-27 DIAGNOSIS — L30.1 DYSHIDROTIC DERMATITIS: ICD-10-CM

## 2025-02-27 PROCEDURE — 99213 OFFICE O/P EST LOW 20 MIN: CPT

## 2025-02-27 RX ORDER — CLOBETASOL PROPIONATE 0.5 MG/G
OINTMENT TOPICAL 2 TIMES DAILY
Qty: 60 G | Refills: 2 | Status: SHIPPED | OUTPATIENT
Start: 2025-02-27

## 2025-02-27 RX ORDER — PANTOPRAZOLE SODIUM 40 MG/1
1 TABLET, DELAYED RELEASE ORAL
COMMUNITY
Start: 2024-12-28

## 2025-02-27 RX ORDER — LUBIPROSTONE 24 UG/1
24 CAPSULE ORAL
COMMUNITY
Start: 2024-10-02

## 2025-02-27 RX ORDER — CLOBETASOL PROPIONATE 0.5 MG/G
OINTMENT TOPICAL
COMMUNITY
Start: 2025-01-06 | End: 2025-02-27 | Stop reason: SDUPTHER

## 2025-02-27 NOTE — PROGRESS NOTES
Subjective   Patient ID: Yulia Parmar is a 60 y.o. female who presents for Rib Injury (States that yesterday when pulling her sock on while standing she pulled something in her R ribcage. ) and Med Refill.  HPI    Rib injury  Sustained two nights ago, gradually improving  Tenderness on palpation of last rib on right side  Recommend NSAIDs      Vitals:    02/27/25 1113   BP: 126/88   Pulse: 63   Temp: 35.7 °C (96.3 °F)   SpO2: 97%       Review of Systems    Objective   Physical Exam  Vitals and nursing note reviewed.   Chest:      Chest wall: Tenderness present. No crepitus or edema. There is no dullness to percussion.             Assessment/Plan   Problem List Items Addressed This Visit    None  Visit Diagnoses       Costochondritis    -  Primary    Dyshidrotic dermatitis        Relevant Medications    clobetasol (Temovate) 0.05 % ointment                 Thank you for coming in today, please call my office if you have any concerns or questions.     Aki AGUILAR, CNP

## 2025-02-27 NOTE — PATIENT INSTRUCTIONS
NSAIDs for relief  Ice pack and heating pad    Thank you for coming in today, if any questions or concerns arise, please call my office.   LAUREN Robles-CNP

## 2025-05-23 ENCOUNTER — APPOINTMENT (OUTPATIENT)
Dept: NEUROLOGY | Facility: HOSPITAL | Age: 60
End: 2025-05-23
Payer: COMMERCIAL

## 2025-09-08 ENCOUNTER — APPOINTMENT (OUTPATIENT)
Dept: PRIMARY CARE | Facility: CLINIC | Age: 60
End: 2025-09-08
Payer: COMMERCIAL